# Patient Record
Sex: FEMALE | Race: WHITE | NOT HISPANIC OR LATINO | Employment: FULL TIME | URBAN - METROPOLITAN AREA
[De-identification: names, ages, dates, MRNs, and addresses within clinical notes are randomized per-mention and may not be internally consistent; named-entity substitution may affect disease eponyms.]

---

## 2018-10-26 ENCOUNTER — OFFICE VISIT (OUTPATIENT)
Dept: PODIATRY | Facility: CLINIC | Age: 28
End: 2018-10-26
Payer: COMMERCIAL

## 2018-10-26 VITALS
SYSTOLIC BLOOD PRESSURE: 127 MMHG | BODY MASS INDEX: 23.65 KG/M2 | WEIGHT: 147.19 LBS | DIASTOLIC BLOOD PRESSURE: 81 MMHG | HEIGHT: 66 IN | HEART RATE: 78 BPM

## 2018-10-26 DIAGNOSIS — L60.0 ONYCHOCRYPTOSIS: ICD-10-CM

## 2018-10-26 DIAGNOSIS — L03.031 PARONYCHIA OF GREAT TOE, RIGHT: ICD-10-CM

## 2018-10-26 DIAGNOSIS — M79.674 PAIN IN TOE OF RIGHT FOOT: ICD-10-CM

## 2018-10-26 PROCEDURE — 87186 SC STD MICRODIL/AGAR DIL: CPT

## 2018-10-26 PROCEDURE — 99999 PR PBB SHADOW E&M-EST. PATIENT-LVL III: CPT | Mod: PBBFAC,,, | Performed by: PODIATRIST

## 2018-10-26 PROCEDURE — 87077 CULTURE AEROBIC IDENTIFY: CPT

## 2018-10-26 PROCEDURE — 99203 OFFICE O/P NEW LOW 30 MIN: CPT | Mod: S$GLB,,, | Performed by: PODIATRIST

## 2018-10-26 PROCEDURE — 87070 CULTURE OTHR SPECIMN AEROBIC: CPT

## 2018-10-26 PROCEDURE — 87075 CULTR BACTERIA EXCEPT BLOOD: CPT

## 2018-10-26 PROCEDURE — 3008F BODY MASS INDEX DOCD: CPT | Mod: CPTII,S$GLB,, | Performed by: PODIATRIST

## 2018-10-26 RX ORDER — CIPROFLOXACIN 500 MG/1
500 TABLET ORAL EVERY 12 HOURS
Qty: 28 TABLET | Refills: 0 | Status: SHIPPED | OUTPATIENT
Start: 2018-10-26 | End: 2018-11-06 | Stop reason: CLARIF

## 2018-10-26 NOTE — PATIENT INSTRUCTIONS
- Cleanse toe via removing dressing after shower/bath and whenever dressing becomes soiled or wet and performing foot soak with 30% betadine/70% lukewarm water for 10 minutes.    - Change dressing after cleansing with betadine daily with thin layer of OTC topical antibiotic ointment and bandaid.    - Take medication as prescribed.    - Take probiotics to reduce risk of upset stomach.    - Notify clinic if new or worsening condition arises.    - Follow up in 1 week for possible removal of ingrown toenail.

## 2018-10-26 NOTE — PROGRESS NOTES
Subjective:      Patient ID: Dawna Loera is a 28 y.o. female.    Chief Complaint: Ingrown Toenail (right great toe  PCP Arminda Pinzon  7/2018)    Dawna is a 28 y.o. female who presents to the clinic complaining of painful ingrown toenail on the right foot.  Patient reports having ingrown toenail for about 1-2 weeks after trimming her toenails too short.  She relates outside of right great toe became very red, swollen, and more painful with pus-like drainage about 2 days ago at which time she attempted to cut out ingrown portion herself but stopped due to pain and has piece of nail partially attached still, which has caused increased pain.  She denies taking any antibiotics for infection but admits to cleaning toe with peroxide and applying neosporin and a bandaid to toe daily.  Patient denies any other pedal complaints at this time.    Review of Systems   Constitution: Negative for chills, diaphoresis and fever.   Cardiovascular: Negative for claudication, cyanosis and leg swelling.   Respiratory: Negative for cough, shortness of breath and wheezing.    Endocrine: Negative for cold intolerance, heat intolerance, polydipsia, polyphagia and polyuria.   Hematologic/Lymphatic: Negative for bleeding problem. Does not bruise/bleed easily.   Skin: Positive for color change, nail changes and suspicious lesions. Negative for dry skin, itching, poor wound healing, rash, skin cancer and unusual hair distribution.   Musculoskeletal: Negative for arthritis, back pain, falls, gout, joint pain, joint swelling, muscle cramps, muscle weakness, myalgias and stiffness.   Gastrointestinal: Negative for change in bowel habit, constipation, diarrhea, nausea and vomiting.   Neurological: Positive for paresthesias. Negative for disturbances in coordination, dizziness, focal weakness, loss of balance, numbness and sensory change.         Objective:      Right pedal exam was performed today.  Physical Exam   Constitutional: She is  oriented to person, place, and time. She appears well-developed and well-nourished. No distress.   Cardiovascular:   Pulses:       Dorsalis pedis pulses are 2+ on the right side.        Posterior tibial pulses are 2+ on the right side.   Pedal pulses palpable 2/4 DP & PT Right LE.  CFT is < 3 seconds to Right hallux.  Skin temperature is warm to warm proximal tibia to distal toes Right LE without localized increase in calor noted.  Mild erythema and edema without ecchymosis noted Right foot or ankle.  Hair growth present distally Right LE.     Musculoskeletal: She exhibits edema and tenderness.        Right foot: There is normal range of motion and no deformity.   Right ankle joint ROM is within normal limits.  Right subtalar joint ROM is within normal limits.  Right midtarsal joint ROM is within normal limits.  Right 1st ray ROM is within normal limits.  Right 1st  MTPJ ROM is within normal limits.   Right ankle joint dorsiflexion is unrestricted with the knee extended and flexed per Silf[place]old exam.    Muscle strength is 5/5 for all right muscle groups tested.   Feet:   Right Foot:   Protective Sensation: 10 sites tested. 10 sites sensed.   Skin Integrity: Positive for skin breakdown, erythema and warmth. Negative for ulcer, blister, callus or dry skin.   Neurological: She is alert and oriented to person, place, and time. She has normal strength. She displays no atrophy and normal reflexes. No sensory deficit. She exhibits normal muscle tone. Coordination and gait normal. She displays no Babinski's sign on the right side.   Reflex Scores:       Achilles reflexes are 2+ on the right side.  Protective sensation is intact to 10/10 sites on the right foot via [place] monofilament.    Proprioception is Intact to the right foot.  Vibratory sensation is Intact to the right foot.   Light touch sensation is Intact to the right foot.   Achilles DTR and [place] STR are Intact to right lower extremity.   Negative  Babinski sign right lower extremity.  Negative clonus sign right lower extremity.  Mild tenderness to palpation of the lateral right hallux nail fold and groove.     Skin: Skin is warm. Capillary refill takes less than 2 seconds. No abrasion, no bruising, no burn, no ecchymosis, no laceration, no lesion, no petechiae and no rash noted. She is not diaphoretic. There is erythema. No cyanosis. Nails show no clubbing.   Right hallux lateral nail border is slightly incurvated and spiculated with small abscess and purulent sanguinous drainage noted to nail fold and groove distally.  Toenails 1-5 are WNL in length and thickness.  Webspaces 1-4 are clean, dry, and intact.  Skin turgor is supple.  No dry, flaky skin noted to RLE.  No subcutaneous nodules or suspicious pigmented lesions appreciable right foot or ankle.     Psychiatric: She has a normal mood and affect. Her behavior is normal. Judgment and thought content normal.   Nursing note and vitals reviewed.        Assessment:       Encounter Diagnoses   Name Primary?    Onychocryptosis     Paronychia of great toe, right     Pain in toe of right foot          Plan:       Dawna was seen today for ingrown toenail.    Diagnoses and all orders for this visit:    Onychocryptosis  -     ciprofloxacin HCl (CIPRO) 500 MG tablet; Take 1 tablet (500 mg total) by mouth every 12 (twelve) hours. for 14 days  -     Aerobic culture  -     Culture, Anaerobic    Paronychia of great toe, right  -     ciprofloxacin HCl (CIPRO) 500 MG tablet; Take 1 tablet (500 mg total) by mouth every 12 (twelve) hours. for 14 days  -     Aerobic culture  -     Culture, Anaerobic    Pain in toe of right foot  -     ciprofloxacin HCl (CIPRO) 500 MG tablet; Take 1 tablet (500 mg total) by mouth every 12 (twelve) hours. for 14 days  -     Aerobic culture  -     Culture, Anaerobic      I counseled the patient on her conditions, their implications and medical management.    - With the patient's  permission, trimmed nail spicule out of wound and small abscess via nail nippers to patient's tolerance.    - Obtained wound culture of purulent drainage.    - Cleansed toe with betadine and applied bandaid.    - Prescribed Cipro.    Patient was given the following recommendations and instructions:  Patient Instructions   - Cleanse toe via removing dressing after shower/bath and whenever dressing becomes soiled or wet and performing foot soak with 30% betadine/70% lukewarm water for 10 minutes.    - Change dressing after cleansing with betadine daily with thin layer of OTC topical antibiotic ointment and bandaid.    - Take medication as prescribed.    - Take probiotics to reduce risk of upset stomach.    - Notify clinic if new or worsening condition arises.    - Follow up in 1 week for possible removal of ingrown toenail.          Opal Dobbs DPM

## 2018-10-29 LAB — BACTERIA SPEC AEROBE CULT: NORMAL

## 2018-10-29 RX ORDER — CLINDAMYCIN HYDROCHLORIDE 300 MG/1
300 CAPSULE ORAL EVERY 6 HOURS
Qty: 40 CAPSULE | Refills: 0 | Status: SHIPPED | OUTPATIENT
Start: 2018-10-29 | End: 2018-11-08

## 2018-10-31 LAB — BACTERIA SPEC ANAEROBE CULT: NORMAL

## 2018-11-06 ENCOUNTER — OFFICE VISIT (OUTPATIENT)
Dept: PODIATRY | Facility: CLINIC | Age: 28
End: 2018-11-06
Payer: COMMERCIAL

## 2018-11-06 VITALS
BODY MASS INDEX: 23.64 KG/M2 | WEIGHT: 147.06 LBS | HEART RATE: 83 BPM | HEIGHT: 66 IN | SYSTOLIC BLOOD PRESSURE: 126 MMHG | DIASTOLIC BLOOD PRESSURE: 77 MMHG

## 2018-11-06 DIAGNOSIS — M79.674 PAIN IN TOE OF RIGHT FOOT: ICD-10-CM

## 2018-11-06 DIAGNOSIS — L60.0 ONYCHOCRYPTOSIS: Primary | ICD-10-CM

## 2018-11-06 PROCEDURE — 3008F BODY MASS INDEX DOCD: CPT | Mod: CPTII,S$GLB,, | Performed by: PODIATRIST

## 2018-11-06 PROCEDURE — 99212 OFFICE O/P EST SF 10 MIN: CPT | Mod: S$GLB,,, | Performed by: PODIATRIST

## 2018-11-06 PROCEDURE — 99999 PR PBB SHADOW E&M-EST. PATIENT-LVL III: CPT | Mod: PBBFAC,,, | Performed by: PODIATRIST

## 2018-11-19 NOTE — PROGRESS NOTES
Subjective:      Patient ID: Dawna Loera is a 28 y.o. female.    Chief Complaint: Follow-up (1 wk re-ck); Ingrown Toenail (right great); and Other Misc (PCP:  Dr Pinzon  July 2018)    Dawna is a 28 y.o. female who presents to the clinic complaining of ingrown toenail on the right foot.  Patient reports performing wound care as instructed.  She relates pain has gone away and rates it as 0/10 on the pain scale.  She denies seeing any recent drainage.  Patient denies any other pedal complaints at this time.    Review of Systems   Cardiovascular: Negative for claudication, cyanosis and leg swelling.   Skin: Positive for color change, nail changes and suspicious lesions. Negative for dry skin, itching, poor wound healing, rash, skin cancer and unusual hair distribution.   Musculoskeletal: Negative for arthritis, back pain, falls, gout, joint pain, joint swelling, muscle cramps, muscle weakness, myalgias and stiffness.   Gastrointestinal: Negative for change in bowel habit, constipation, diarrhea, nausea and vomiting.   Neurological: Positive for paresthesias. Negative for disturbances in coordination, dizziness, focal weakness, loss of balance, numbness and sensory change.         Objective:      Right pedal exam was performed today.  Physical Exam   Constitutional: She is oriented to person, place, and time. She appears well-developed and well-nourished. No distress.   Cardiovascular:   Pulses:       Dorsalis pedis pulses are 2+ on the right side.        Posterior tibial pulses are 2+ on the right side.   Pedal pulses palpable 2/4 DP & PT Right LE.  CFT is < 3 seconds to Right hallux.  Skin temperature is warm to warm proximal tibia to distal toes Right LE without localized increase in calor noted.  No erythema, edema, or ecchymosis noted Right foot or ankle.  Hair growth present distally Right LE.     Musculoskeletal: She exhibits no edema or tenderness.        Right foot: There is normal range of motion and  no deformity.   Right ankle joint ROM is within normal limits.  Right subtalar joint ROM is within normal limits.  Right midtarsal joint ROM is within normal limits.  Right 1st ray ROM is within normal limits.  Right 1st  MTPJ ROM is within normal limits.   Right ankle joint dorsiflexion is unrestricted with the knee extended and flexed per Silfvers[place]old exam.    Muscle strength is 5/5 for all right muscle groups tested.   Feet:   Right Foot:   Protective Sensation: 10 sites tested. 10 sites sensed.   Skin Integrity: Negative for ulcer, blister, skin breakdown, erythema, warmth, callus or dry skin.   Neurological: She is alert and oriented to person, place, and time. She has normal strength. She displays no atrophy and normal reflexes. No sensory deficit. She exhibits normal muscle tone. Coordination and gait normal. She displays no Babinski's sign on the right side.   Reflex Scores:       Achilles reflexes are 2+ on the right side.  Epicritic sensation is grossly intact distally RLE.  [place] STR is negative RLE.  No tenderness to palpation of the lateral right hallux nail fold and groove.     Skin: Skin is warm, dry and intact. Capillary refill takes less than 2 seconds. No abrasion, no bruising, no burn, no ecchymosis, no laceration, no lesion, no petechiae and no rash noted. She is not diaphoretic. No cyanosis or erythema. Nails show no clubbing.   Right hallux lateral nail border is slightly incurvated without wound present.  Toenails 1-5 are WNL in length and thickness.  Webspaces 1-4 are clean, dry, and intact.  Skin turgor is supple.  No dry, flaky skin noted to RLE.  No subcutaneous nodules or suspicious pigmented lesions appreciable right foot or ankle.     Psychiatric: She has a normal mood and affect. Her behavior is normal. Judgment and thought content normal.   Nursing note and vitals reviewed.        Assessment:       Encounter Diagnoses   Name Primary?    Onychocryptosis - Right Foot Yes    Pain  in toe of right foot - Right Foot          Plan:       Dawna was seen today for follow-up, ingrown toenail and other misc.    Diagnoses and all orders for this visit:    Onychocryptosis - Right Foot    Pain in toe of right foot - Right Foot      I counseled the patient on her conditions, their implications and medical management.    - Reviewed with patient treatment options in case of recurrence.  Patient opted to defer PNA today.  Applied topical antibiotic cream and bandaid.    Patient was given the following recommendations and instructions:  Patient Instructions   - Apply thin layer of OTC topical antibiotic ointment and bandaid to ingrown nail border to keep skin moist and prevent infection if recurs while toenail grows out.    - Wear toe spacer at all times when ambulating.    - Finish course of oral antibiotics.    - Take probiotics to reduce risk of upset stomach.    - Notify clinic if new or worsening condition arises.    - Follow up as needed.          Opal Dobbs DPM

## 2018-11-19 NOTE — PATIENT INSTRUCTIONS
- Apply thin layer of OTC topical antibiotic ointment and bandaid to ingrown nail border to keep skin moist and prevent infection if recurs while toenail grows out.    - Wear toe spacer at all times when ambulating.    - Finish course of oral antibiotics.    - Take probiotics to reduce risk of upset stomach.    - Notify clinic if new or worsening condition arises.    - Follow up as needed.

## 2020-03-06 ENCOUNTER — OFFICE VISIT (OUTPATIENT)
Dept: URGENT CARE | Facility: CLINIC | Age: 30
End: 2020-03-06
Payer: COMMERCIAL

## 2020-03-06 VITALS
HEART RATE: 83 BPM | DIASTOLIC BLOOD PRESSURE: 88 MMHG | OXYGEN SATURATION: 99 % | HEIGHT: 66 IN | SYSTOLIC BLOOD PRESSURE: 147 MMHG | TEMPERATURE: 99 F | BODY MASS INDEX: 23.63 KG/M2 | RESPIRATION RATE: 18 BRPM | WEIGHT: 147 LBS

## 2020-03-06 DIAGNOSIS — R68.89 FLU-LIKE SYMPTOMS: Primary | ICD-10-CM

## 2020-03-06 LAB
CTP QC/QA: YES
FLUAV AG NPH QL: NEGATIVE
FLUBV AG NPH QL: NEGATIVE

## 2020-03-06 PROCEDURE — 99213 PR OFFICE/OUTPT VISIT, EST, LEVL III, 20-29 MIN: ICD-10-PCS | Mod: 25,S$GLB,, | Performed by: FAMILY MEDICINE

## 2020-03-06 PROCEDURE — 87804 POCT INFLUENZA A/B: ICD-10-PCS | Mod: 59,QW,S$GLB, | Performed by: FAMILY MEDICINE

## 2020-03-06 PROCEDURE — 87804 INFLUENZA ASSAY W/OPTIC: CPT | Mod: QW,S$GLB,, | Performed by: FAMILY MEDICINE

## 2020-03-06 PROCEDURE — 99213 OFFICE O/P EST LOW 20 MIN: CPT | Mod: 25,S$GLB,, | Performed by: FAMILY MEDICINE

## 2020-03-06 RX ORDER — DIPHENOXYLATE HYDROCHLORIDE AND ATROPINE SULFATE 2.5; .025 MG/1; MG/1
1 TABLET ORAL 4 TIMES DAILY PRN
Qty: 20 TABLET | Refills: 0 | Status: SHIPPED | OUTPATIENT
Start: 2020-03-06 | End: 2020-07-24

## 2020-03-06 RX ORDER — ONDANSETRON 4 MG/1
4 TABLET, ORALLY DISINTEGRATING ORAL EVERY 6 HOURS PRN
Qty: 20 TABLET | Refills: 0 | Status: SHIPPED | OUTPATIENT
Start: 2020-03-06 | End: 2020-07-24

## 2020-03-06 NOTE — PATIENT INSTRUCTIONS
Please return here or go to the Emergency Department for any concerns or worsening of condition.  If you were prescribed antibiotics, please take them to completion.  If you were prescribed a narcotic medication, do not drive or operate heavy equipment or machinery while taking these medications.  Please follow up with your primary care doctor or specialist as needed.  If you  smoke, please stop smoking.  DIARRHEA    Diarrhea has several possible causes. Common 'stomach flu' is caused by a virus. Food poisoning, bacteria or parasites are other causes for diarrhea. Only diarrhea caused by bacteria or parasites requires treatment with an antibiotic. Diarrhea from a virus or food poisoning improves with simple home treatment. Antibiotic usage in recent weeks can also be a cause of your diarrhea, a condition called 'c. Diff'; so it is important to let the doctor know if you have taken any within the last few weeks    A stool sample is needed to make the diagnosis of an infection with bacteria or parasites. Up to three stool specimens may be required to diagnose This may take several days to get the result. It may be necessary to wait until the stool test is complete to make the diagnosis and select the best antibiotic to prescribe.    HOME CARE:  1. If symptoms are severe, rest at home for the next 24 hours or until you are feeling better.  2. You may use acetaminophen (Tylenol) or ibuprofen (Motrin, Advil) to control fever, unless another medicine was prescribed. [NOTE: If you have chronic liver or kidney disease or ever had a stomach ulcer or GI bleeding, talk with your doctor before using these medicines.] (Aspirin should never be used in anyone under 18 years of age who is ill with a fever. It may cause severe liver damage.)  3. Avoid tobacco, caffeine and alcohol, which may worsen your symptoms.  4. If anti-diarrhea medicine was prescribed, take this only as directed. Sometimes anti-diarrhea medicine can make your  condition worse if the cause is an infectious diarrhea. Therefore, anti-diarrhea medicine should not be taken for this condition unless advised by your doctor.    DURING THE FIRST 12-24 HOURS follow the diet below:  BEVERAGES: Sport drinks like Gatorade, soft drinks without caffeine; ginger ale, mineral water (plain or flavored), decaffeinated tea and coffee.  SOUPS: Clear broth, consommé and bouillon  DESSERTS: Plain gelatin (Jell-O), popsicles and fruit juice bars.    DURING THE NEXT 24 HOURS you may add the following to the above:  Hot cereal, plain toast, bread, rolls, crackers  Plain noodles, rice, mashed potatoes, chicken noodle or rice soup  Unsweetened canned fruit (avoid pineapple), bananas  Limit fat intake to less than 15 grams per day by avoiding margarine, butter, oils, mayonnaise, sauces, gravies, fried foods, peanut butter, meat, poultry and fish.  Limit fiber; avoid raw or cooked vegetables, fresh fruits (except bananas) and bran cereals.  Limit caffeine and chocolate. No spices or seasonings except salt.    DURING THE NEXT 24 HOURS    Gradually resume a normal diet, as you feel better and your symptoms lessen.    FOLLOW UP with your doctor or as advised if you are not improving over the next two days. If you were asked to bring a specimen from home, bring the sample on the day of collection. You may call in 2 days (or as directed) for the results.    GET PROMPT MEDICAL ATTENTION if any of the following occur:  Increasing abdominal pain or constant lower right abdominal pain  Continued vomiting (unable to keep liquids down)  Frequent diarrhea (more than 5 times a day)  Blood in vomit or stool (black or red color)  Reduced oral intake  Dark urine, reduced urine output  Weakness, dizziness, fainting  Drowsiness, confusion, stiff neck or seizure  Fever over 101.0°F (38.3°C) for more than 3 days  New rash    Proceed to ER if symptoms worsen or do not improve    Please return here or go to the Emergency  Department for any concerns or worsening of condition.  If you were prescribed antibiotics, please take them to completion.  If you were prescribed a narcotic medication, do not drive or operate heavy equipment or machinery while taking these medications.  Please follow up with your primary care doctor or specialist as needed.  If you  smoke, please stop smoking.

## 2020-03-06 NOTE — PROGRESS NOTES
"Subjective:       Patient ID: Dawna Loera is a 30 y.o. female.    Vitals:  height is 5' 6" (1.676 m) and weight is 66.7 kg (147 lb). Her tympanic temperature is 98.7 °F (37.1 °C). Her blood pressure is 147/88 (abnormal) and her pulse is 83. Her respiration is 18 and oxygen saturation is 99%.     Chief Complaint: Diarrhea    Pt states for past week she had allergies. Diarrhea and vomiting Tuesday-Wed. Thursday went to work, weak and had bowel movement was diarrhea like. Today stool is still diarrhea like consistency. Pt took dayquil and allergy meds. Pt took immatrol for nausea Tuesday night. No meds taken today or yesterday.     Diarrhea    This is a new problem. The current episode started in the past 7 days. The problem occurs 2 to 4 times per day. The problem has been gradually worsening. The stool consistency is described as watery. The patient states that diarrhea awakens her from sleep. Associated symptoms include chills, headaches and vomiting. Pertinent negatives include no arthralgias, coughing or myalgias. Nothing aggravates the symptoms. There are no known risk factors. Treatments tried: dayquil, immatrol. The treatment provided no relief.       Constitution: Positive for chills. Negative for fatigue.   HENT: Positive for congestion.    Neck: Negative for painful lymph nodes.   Cardiovascular: Negative for chest pain and leg swelling.   Eyes: Negative for double vision and blurred vision.   Respiratory: Negative for cough and shortness of breath.    Gastrointestinal: Positive for vomiting and diarrhea. Negative for nausea.   Genitourinary: Negative for dysuria, frequency, urgency and history of kidney stones.   Musculoskeletal: Negative for joint pain, joint swelling, muscle cramps and muscle ache.   Skin: Negative for color change, pale, rash and bruising.   Allergic/Immunologic: Positive for flu shot. Negative for seasonal allergies.   Neurological: Positive for headaches. Negative for dizziness, " history of vertigo, light-headedness and passing out.   Hematologic/Lymphatic: Negative for swollen lymph nodes.   Psychiatric/Behavioral: Negative for nervous/anxious, sleep disturbance and depression. The patient is not nervous/anxious.        Objective:      Physical Exam      Assessment:       No diagnosis found.    Plan:         There are no diagnoses linked to this encounter.

## 2020-03-11 ENCOUNTER — TELEPHONE (OUTPATIENT)
Dept: URGENT CARE | Facility: CLINIC | Age: 30
End: 2020-03-11

## 2020-04-21 DIAGNOSIS — Z01.84 ANTIBODY RESPONSE EXAMINATION: ICD-10-CM

## 2020-05-21 DIAGNOSIS — Z01.84 ANTIBODY RESPONSE EXAMINATION: ICD-10-CM

## 2020-06-20 DIAGNOSIS — Z01.84 ANTIBODY RESPONSE EXAMINATION: ICD-10-CM

## 2020-07-20 DIAGNOSIS — Z01.84 ANTIBODY RESPONSE EXAMINATION: ICD-10-CM

## 2020-07-24 ENCOUNTER — OFFICE VISIT (OUTPATIENT)
Dept: DERMATOLOGY | Facility: CLINIC | Age: 30
End: 2020-07-24
Payer: COMMERCIAL

## 2020-07-24 DIAGNOSIS — L70.0 ACNE VULGARIS: Primary | ICD-10-CM

## 2020-07-24 PROCEDURE — 99201 PR OFFICE/OUTPT VISIT,NEW,LEVL I: CPT | Mod: 95,,, | Performed by: DERMATOLOGY

## 2020-07-24 PROCEDURE — 99201 PR OFFICE/OUTPT VISIT,NEW,LEVL I: ICD-10-PCS | Mod: 95,,, | Performed by: DERMATOLOGY

## 2020-07-24 RX ORDER — TRETINOIN 0.5 MG/G
CREAM TOPICAL
Qty: 45 G | Refills: 5 | Status: SHIPPED | OUTPATIENT
Start: 2020-07-24

## 2020-07-24 RX ORDER — CLINDAMYCIN PHOSPHATE 10 MG/G
GEL TOPICAL
Qty: 30 G | Refills: 5 | Status: SHIPPED | OUTPATIENT
Start: 2020-07-24

## 2020-07-24 NOTE — PROGRESS NOTES
"  Subjective:       Patient ID:  Dawna Loera is a 30 y.o. female who presents for No chief complaint on file.    Initial visit  "lifelong acne problems"  Treated by 'a skin lady" selling face reality line, weekly peels glycolic or sal acid, cannot see her because of COVID  Flaring under the mask  In the past, superficial, but past few months, deeper tender ones  Avoids dairy    No PO meds    No OCP, uses condoms  Previously on OCP x 10 years  Planning conception in 2 years    The patient location is: at work  The chief complaint leading to consultation is: acne    Visit type: audiovisual    Face to Face time with patient: 10min  15 minutes of total time spent on the encounter, which includes face to face time and non-face to face time preparing to see the patient (eg, review of tests), Obtaining and/or reviewing separately obtained history, Documenting clinical information in the electronic or other health record, Independently interpreting results (not separately reported) and communicating results to the patient/family/caregiver, or Care coordination (not separately reported).     Each patient to whom he or she provides medical services by telemedicine is:  (1) informed of the relationship between the physician and patient and the respective role of any other health care provider with respect to management of the patient; and (2) notified that he or she may decline to receive medical services by telemedicine and may withdraw from such care at any time.                Review of Systems   Constitutional: Negative for fever and chills.   Skin: Positive for daily sunscreen use and activity-related sunscreen use. Negative for itching and rash.        Objective:    Physical Exam   Constitutional: She appears well-developed and well-nourished. No distress.   Neurological: She is alert and oriented to person, place, and time. She is not disoriented.   Psychiatric: She has a normal mood and affect.   Skin:   Areas " Examined (abnormalities noted in diagram):   Head / Face Inspection Performed  Neck Inspection Performed              Diagram Legend     Erythematous scaling macule/papule c/w actinic keratosis       Vascular papule c/w angioma      Pigmented verrucoid papule/plaque c/w seborrheic keratosis      Yellow umbilicated papule c/w sebaceous hyperplasia      Irregularly shaped tan macule c/w lentigo     1-2 mm smooth white papules consistent with Milia      Movable subcutaneous cyst with punctum c/w epidermal inclusion cyst      Subcutaneous movable cyst c/w pilar cyst      Firm pink to brown papule c/w dermatofibroma      Pedunculated fleshy papule(s) c/w skin tag(s)      Evenly pigmented macule c/w junctional nevus     Mildly variegated pigmented, slightly irregular-bordered macule c/w mildly atypical nevus      Flesh colored to evenly pigmented papule c/w intradermal nevus       Pink pearly papule/plaque c/w basal cell carcinoma      Erythematous hyperkeratotic cursted plaque c/w SCC      Surgical scar with no sign of skin cancer recurrence      Open and closed comedones      Inflammatory papules and pustules      Verrucoid papule consistent consistent with wart     Erythematous eczematous patches and plaques     Dystrophic onycholytic nail with subungual debris c/w onychomycosis     Umbilicated papule    Erythematous-base heme-crusted tan verrucoid plaque consistent with inflamed seborrheic keratosis     Erythematous Silvery Scaling Plaque c/w Psoriasis     See annotation      Assessment / Plan:        Acne vulgaris  -     clindamycin phosphate 1% (CLINDAGEL) 1 % gel; Apply thin film to acne prone skin QAM  Dispense: 30 g; Refill: 5  -     tretinoin (RETIN-A) 0.05 % cream; Thin film to entire face at bedtime  Dispense: 45 g; Refill: 5  -     sulfacetamide sod-sulfur-urea 10-4-10 % Clsr; Use to wash face daily  Dispense: 473 mL; Refill: 2           Does not wish to go on systemic meds with hormonal activity    No  follow-ups on file.

## 2020-08-19 DIAGNOSIS — Z01.84 ANTIBODY RESPONSE EXAMINATION: ICD-10-CM

## 2020-09-18 DIAGNOSIS — Z01.84 ANTIBODY RESPONSE EXAMINATION: ICD-10-CM

## 2020-10-18 DIAGNOSIS — Z01.84 ANTIBODY RESPONSE EXAMINATION: ICD-10-CM

## 2020-11-17 ENCOUNTER — CLINICAL SUPPORT (OUTPATIENT)
Dept: URGENT CARE | Facility: CLINIC | Age: 30
End: 2020-11-17
Payer: COMMERCIAL

## 2020-11-17 VITALS — HEART RATE: 61 BPM | TEMPERATURE: 98 F | OXYGEN SATURATION: 99 %

## 2020-11-17 DIAGNOSIS — Z01.84 ANTIBODY RESPONSE EXAMINATION: ICD-10-CM

## 2020-11-17 PROCEDURE — 90686 FLU VACCINE (QUAD) GREATER THAN OR EQUAL TO 3YO PRESERVATIVE FREE IM: ICD-10-PCS | Mod: S$GLB,,, | Performed by: PHYSICIAN ASSISTANT

## 2020-11-17 PROCEDURE — 90471 IMMUNIZATION ADMIN: CPT | Mod: S$GLB,,, | Performed by: PHYSICIAN ASSISTANT

## 2020-11-17 PROCEDURE — 90471 FLU VACCINE (QUAD) GREATER THAN OR EQUAL TO 3YO PRESERVATIVE FREE IM: ICD-10-PCS | Mod: S$GLB,,, | Performed by: PHYSICIAN ASSISTANT

## 2020-11-17 PROCEDURE — 90686 IIV4 VACC NO PRSV 0.5 ML IM: CPT | Mod: S$GLB,,, | Performed by: PHYSICIAN ASSISTANT

## 2021-04-16 ENCOUNTER — PATIENT MESSAGE (OUTPATIENT)
Dept: RESEARCH | Facility: HOSPITAL | Age: 31
End: 2021-04-16

## 2021-08-26 ENCOUNTER — LAB VISIT (OUTPATIENT)
Dept: FAMILY MEDICINE | Facility: CLINIC | Age: 31
End: 2021-08-26
Payer: COMMERCIAL

## 2021-08-26 DIAGNOSIS — Z20.822 ENCOUNTER FOR LABORATORY TESTING FOR COVID-19 VIRUS: ICD-10-CM

## 2021-08-26 PROCEDURE — U0003 INFECTIOUS AGENT DETECTION BY NUCLEIC ACID (DNA OR RNA); SEVERE ACUTE RESPIRATORY SYNDROME CORONAVIRUS 2 (SARS-COV-2) (CORONAVIRUS DISEASE [COVID-19]), AMPLIFIED PROBE TECHNIQUE, MAKING USE OF HIGH THROUGHPUT TECHNOLOGIES AS DESCRIBED BY CMS-2020-01-R: HCPCS | Performed by: FAMILY MEDICINE

## 2021-08-26 PROCEDURE — U0005 INFEC AGEN DETEC AMPLI PROBE: HCPCS | Performed by: FAMILY MEDICINE

## 2021-08-27 LAB
SARS-COV-2 RNA RESP QL NAA+PROBE: NOT DETECTED
SARS-COV-2- CYCLE NUMBER: NORMAL

## 2021-09-16 ENCOUNTER — TELEPHONE (OUTPATIENT)
Dept: INTERNAL MEDICINE | Facility: CLINIC | Age: 31
End: 2021-09-16

## 2021-09-16 ENCOUNTER — OCCUPATIONAL HEALTH (OUTPATIENT)
Dept: URGENT CARE | Facility: CLINIC | Age: 31
End: 2021-09-16

## 2021-09-16 ENCOUNTER — PATIENT MESSAGE (OUTPATIENT)
Dept: INFECTIOUS DISEASES | Facility: HOSPITAL | Age: 31
End: 2021-09-16

## 2021-09-16 ENCOUNTER — TELEPHONE (OUTPATIENT)
Dept: URGENT CARE | Facility: CLINIC | Age: 31
End: 2021-09-16

## 2021-09-16 DIAGNOSIS — U07.1 COVID-19 VIRUS INFECTION: Primary | ICD-10-CM

## 2021-09-16 DIAGNOSIS — R05.9 COUGH: Primary | ICD-10-CM

## 2021-09-16 DIAGNOSIS — R05.9 COUGH: ICD-10-CM

## 2021-09-16 LAB
CTP QC/QA: YES
SARS-COV-2 RDRP RESP QL NAA+PROBE: POSITIVE

## 2021-09-16 PROCEDURE — U0002: ICD-10-PCS | Mod: QW,S$GLB,, | Performed by: PREVENTIVE MEDICINE

## 2021-09-16 PROCEDURE — U0002 COVID-19 LAB TEST NON-CDC: HCPCS | Mod: QW,S$GLB,, | Performed by: PREVENTIVE MEDICINE

## 2024-01-14 PROBLEM — Z34.90 TERM PREGNANCY: Status: ACTIVE | Noted: 2024-01-14

## 2024-01-14 PROBLEM — O47.9 UTERINE CONTRACTIONS: Status: ACTIVE | Noted: 2024-01-14
